# Patient Record
Sex: FEMALE | Race: WHITE | NOT HISPANIC OR LATINO | Employment: OTHER | URBAN - NONMETROPOLITAN AREA
[De-identification: names, ages, dates, MRNs, and addresses within clinical notes are randomized per-mention and may not be internally consistent; named-entity substitution may affect disease eponyms.]

---

## 2023-12-04 ENCOUNTER — TELEPHONE (OUTPATIENT)
Dept: CARDIOLOGY | Facility: CLINIC | Age: 78
End: 2023-12-04

## 2023-12-04 DIAGNOSIS — R79.89 ELEVATED TROPONIN: ICD-10-CM

## 2023-12-04 NOTE — TELEPHONE ENCOUNTER
Per Dr. Jackie Carrillo MD / hospital discharge patient to be scheduled for out patient lexiscan 2 day for increased troponin.

## 2023-12-13 ENCOUNTER — HOSPITAL ENCOUNTER (OUTPATIENT)
Dept: CARDIOLOGY | Facility: CLINIC | Age: 78
Discharge: HOME | End: 2023-12-13
Payer: MEDICARE

## 2023-12-13 ENCOUNTER — ANCILLARY PROCEDURE (OUTPATIENT)
Dept: RADIOLOGY | Facility: CLINIC | Age: 78
End: 2023-12-13
Payer: MEDICARE

## 2023-12-13 VITALS — DIASTOLIC BLOOD PRESSURE: 76 MMHG | HEART RATE: 72 BPM | SYSTOLIC BLOOD PRESSURE: 106 MMHG

## 2023-12-13 DIAGNOSIS — R79.89 ELEVATED TROPONIN: ICD-10-CM

## 2023-12-13 PROCEDURE — 93018 CV STRESS TEST I&R ONLY: CPT | Performed by: INTERNAL MEDICINE

## 2023-12-13 PROCEDURE — A9502 TC99M TETROFOSMIN: HCPCS | Performed by: INTERNAL MEDICINE

## 2023-12-13 PROCEDURE — 93017 CV STRESS TEST TRACING ONLY: CPT

## 2023-12-13 PROCEDURE — 3430000001 HC RX 343 DIAGNOSTIC RADIOPHARMACEUTICALS: Performed by: INTERNAL MEDICINE

## 2023-12-13 PROCEDURE — 93016 CV STRESS TEST SUPVJ ONLY: CPT | Performed by: INTERNAL MEDICINE

## 2023-12-13 PROCEDURE — 78452 HT MUSCLE IMAGE SPECT MULT: CPT | Performed by: INTERNAL MEDICINE

## 2023-12-13 PROCEDURE — 2500000004 HC RX 250 GENERAL PHARMACY W/ HCPCS (ALT 636 FOR OP/ED): Performed by: INTERNAL MEDICINE

## 2023-12-13 RX ORDER — REGADENOSON 0.08 MG/ML
0.4 INJECTION, SOLUTION INTRAVENOUS ONCE
Status: COMPLETED | OUTPATIENT
Start: 2023-12-13 | End: 2023-12-13

## 2023-12-13 RX ADMIN — TETROFOSMIN 35.4 MILLICURIE: 0.23 INJECTION, POWDER, LYOPHILIZED, FOR SOLUTION INTRAVENOUS at 13:39

## 2023-12-13 RX ADMIN — REGADENOSON 0.4 MG: 0.08 INJECTION, SOLUTION INTRAVENOUS at 13:38

## 2023-12-13 RX ADMIN — TETROFOSMIN 11 MILLICURIE: 0.23 INJECTION, POWDER, LYOPHILIZED, FOR SOLUTION INTRAVENOUS at 12:40

## 2023-12-18 ENCOUNTER — TELEPHONE (OUTPATIENT)
Dept: CARDIOLOGY | Facility: CLINIC | Age: 78
End: 2023-12-18
Payer: MEDICARE

## 2023-12-18 NOTE — TELEPHONE ENCOUNTER
----- Message from Jackie Carrillo MD sent at 12/17/2023  7:16 PM EST -----  Let her know the stress test came back showing an area of blockage, a heart cath is recommended  ----- Message -----  From: Interface, Radiology Results In  Sent: 12/15/2023   2:35 PM EST  To: Jackie Carrillo MD

## 2023-12-19 PROBLEM — I10 ESSENTIAL HYPERTENSION: Status: ACTIVE | Noted: 2023-12-19

## 2023-12-19 PROBLEM — J45.909 ASTHMA (HHS-HCC): Status: ACTIVE | Noted: 2023-12-19

## 2023-12-19 PROBLEM — R79.89 ELEVATED TROPONIN: Status: ACTIVE | Noted: 2023-12-19

## 2023-12-19 PROBLEM — R94.39 ABNORMAL CARDIOVASCULAR STRESS TEST: Status: ACTIVE | Noted: 2023-12-19

## 2023-12-19 RX ORDER — BUTALB/ACETAMINOPHEN/CAFFEINE 50-325-40
1 TABLET ORAL DAILY
COMMUNITY
Start: 2009-02-26

## 2023-12-19 RX ORDER — NAPROXEN 250 MG/1
250 TABLET ORAL
COMMUNITY
End: 2024-01-29 | Stop reason: ALTCHOICE

## 2023-12-19 RX ORDER — BISMUTH SUBSALICYLATE 262 MG
1 TABLET,CHEWABLE ORAL DAILY
COMMUNITY
End: 2024-01-29 | Stop reason: ALTCHOICE

## 2023-12-19 RX ORDER — NAPROXEN SODIUM 220 MG/1
1 TABLET, FILM COATED ORAL DAILY
COMMUNITY
Start: 2009-02-26

## 2023-12-19 RX ORDER — VERAPAMIL HYDROCHLORIDE 240 MG/1
240 TABLET, FILM COATED, EXTENDED RELEASE ORAL
COMMUNITY

## 2023-12-19 RX ORDER — HYDROCHLOROTHIAZIDE 25 MG/1
1 TABLET ORAL DAILY
COMMUNITY
Start: 2009-02-26 | End: 2024-04-10 | Stop reason: ALTCHOICE

## 2023-12-19 RX ORDER — FLUTICASONE PROPIONATE 50 MCG
2 SPRAY, SUSPENSION (ML) NASAL DAILY
COMMUNITY

## 2023-12-19 RX ORDER — MULTIVIT-MIN/IRON FUM/FOLIC AC 7.5 MG-4
1 TABLET ORAL DAILY
COMMUNITY

## 2023-12-19 RX ORDER — THEOPHYLLINE 400 MG/1
400 TABLET, EXTENDED RELEASE ORAL DAILY
COMMUNITY

## 2023-12-19 RX ORDER — VITAMIN B COMPLEX
1 CAPSULE ORAL DAILY
COMMUNITY

## 2023-12-19 RX ORDER — ATORVASTATIN CALCIUM 20 MG/1
20 TABLET, FILM COATED ORAL DAILY
COMMUNITY
Start: 2023-12-02 | End: 2024-01-25 | Stop reason: DRUGHIGH

## 2023-12-19 RX ORDER — MAGNESIUM GLYCINATE 100 MG
200 CAPSULE ORAL 2 TIMES DAILY
COMMUNITY

## 2023-12-19 RX ORDER — GUAIFENESIN AND PSEUDOEPHEDRINE HYDROCHLORIDE 1200; 120 MG/1; MG/1
TABLET, EXTENDED RELEASE ORAL
COMMUNITY

## 2023-12-19 RX ORDER — ALBUTEROL SULFATE 90 UG/1
2 AEROSOL, METERED RESPIRATORY (INHALATION) EVERY 4 HOURS PRN
COMMUNITY
Start: 2023-09-17

## 2023-12-19 NOTE — TELEPHONE ENCOUNTER
Spoke with patient advised of the above. Reviewed her medications and updated medication list. She verbalized understanding. Order form completed placed on Dr. Jackie Carrillo MD desk for signature. Transferred to scheduling

## 2023-12-20 ENCOUNTER — TELEPHONE (OUTPATIENT)
Dept: CARDIOLOGY | Facility: CLINIC | Age: 78
End: 2023-12-20
Payer: MEDICARE

## 2023-12-22 ENCOUNTER — TELEPHONE (OUTPATIENT)
Dept: CARDIOLOGY | Facility: CLINIC | Age: 78
End: 2023-12-22
Payer: MEDICARE

## 2023-12-22 NOTE — TELEPHONE ENCOUNTER
Patient phoned questioning when to hold her Aleve prior to heart cath that is scheduled for 1/10/2024. Discussed that this is not a normal medication  we tell to hold, but with it being a NSAID, it can thin her blood slightly. We recommended to hold for 5 days prior, reviewed with Bonnie HERNÁNDEZ LPN. Patient verbalized understanding.

## 2023-12-22 NOTE — TELEPHONE ENCOUNTER
Received phone call from [facility] cath lab stating patient was to arrive for heart catheterization today 12/22 by 12:30, patient was a no show. Phoned patient to check on status, patient states she was never given information, date/time of procedure. Will phone back to our office to reschedule.     Patient phoned back and rescheduled cath for 1/10/24 @1pm

## 2024-01-10 LAB
NON-UH HIE ANION GAP: 13.2 (ref 6–15)
NON-UH HIE BASOPHILS # (AUTO): 0.1 10*3/UL (ref 0–0.2)
NON-UH HIE BASOPHILS % (AUTO): 1.1 %
NON-UH HIE BLOOD UREA NITROGEN: 27 MG/DL (ref 7–25)
NON-UH HIE CARBON DIOXIDE: 29.5 MMOL/L (ref 21–31)
NON-UH HIE CHLORIDE: 99 MMOL/L (ref 98–107)
NON-UH HIE CREATININE CLR CALC PHARMACY: 42.87
NON-UH HIE CREATININE: 1.16 MG/DL (ref 0.6–1.2)
NON-UH HIE EOSINOPHILS # (AUTO): 0.3 10*3/UL (ref 0–0.45)
NON-UH HIE EOSINOPHILS % (AUTO): 2.3 %
NON-UH HIE ESTIMATED GFR: 47.66
NON-UH HIE HEMATOCRIT: 38.6 % (ref 34–46.4)
NON-UH HIE HEMOGLOBIN: 13.1 G/DL (ref 11.8–15.4)
NON-UH HIE INR: 1.1
NON-UH HIE LYMPHOCYTES # (AUTO): 2.5 10*3/UL (ref 1–4.8)
NON-UH HIE LYMPHOCYTES % (AUTO): 20.7 %
NON-UH HIE MEAN CORPUSCULAR HEMOGLOBIN: 29.2 PG (ref 24.7–34.3)
NON-UH HIE MEAN CORPUSCULAR HGB CONC: 34 G/DL (ref 32–35)
NON-UH HIE MEAN CORPUSCULAR VOLUME: 85.9 FL (ref 80–100)
NON-UH HIE MEAN PLATELET VOLUME: 7.8 FL (ref 6.3–10.7)
NON-UH HIE MONOCYTES # (AUTO): 0.9 10*3/UL (ref 0–0.8)
NON-UH HIE MONOCYTES % (AUTO): 7.3 %
NON-UH HIE NEUTROPHILS # (AUTO): 8.3 10*3/UL (ref 1.8–7.7)
NON-UH HIE NEUTROPHILS % (AUTO): 68.6 %
NON-UH HIE NRBC%: 0.1 /100{WBC} (ref 0–0.5)
NON-UH HIE PARTIAL THROMBOPLASTIN TIME: 31.8 S (ref 25.1–36.5)
NON-UH HIE PLATELET COUNT: 306 10*3/UL (ref 150–450)
NON-UH HIE POTASSIUM: 3.7 MMOL/L (ref 3.5–5.1)
NON-UH HIE PROTHROMBIN TIME: 12.7 S (ref 9–12.9)
NON-UH HIE RED BLOOD COUNT: 4.5 (ref 3.6–5)
NON-UH HIE RED CELL DISTRIBUTION WIDTH: 14 % (ref 11.9–15.3)
NON-UH HIE SODIUM: 138 MMOL/L (ref 136–145)
NON-UH HIE UNCORRECTED WBC: 12.1 10*3/UL (ref 3.8–11.6)
NON-UH HIE WHITE BLOOD COUNT: 12.1 10*3/UL (ref 3.8–11.6)

## 2024-01-11 ENCOUNTER — TELEPHONE (OUTPATIENT)
Dept: CARDIOLOGY | Facility: CLINIC | Age: 79
End: 2024-01-11
Payer: MEDICARE

## 2024-01-17 ENCOUNTER — APPOINTMENT (OUTPATIENT)
Dept: CARDIOLOGY | Facility: CLINIC | Age: 79
End: 2024-01-17
Payer: MEDICARE

## 2024-01-25 RX ORDER — ATORVASTATIN CALCIUM 80 MG/1
80 TABLET, FILM COATED ORAL NIGHTLY
COMMUNITY

## 2024-01-25 RX ORDER — CLOPIDOGREL BISULFATE 75 MG/1
1 TABLET ORAL DAILY
COMMUNITY

## 2024-01-25 RX ORDER — NITROGLYCERIN 0.4 MG/1
0.4 TABLET SUBLINGUAL EVERY 5 MIN PRN
COMMUNITY

## 2024-01-25 RX ORDER — VALSARTAN 80 MG/1
80 TABLET ORAL DAILY
COMMUNITY
End: 2024-02-13 | Stop reason: SINTOL

## 2024-01-29 ENCOUNTER — OFFICE VISIT (OUTPATIENT)
Dept: CARDIOLOGY | Facility: CLINIC | Age: 79
End: 2024-01-29
Payer: MEDICARE

## 2024-01-29 VITALS
SYSTOLIC BLOOD PRESSURE: 138 MMHG | HEIGHT: 65 IN | HEART RATE: 80 BPM | BODY MASS INDEX: 36.15 KG/M2 | DIASTOLIC BLOOD PRESSURE: 78 MMHG | WEIGHT: 217 LBS

## 2024-01-29 DIAGNOSIS — I25.10 3-VESSEL CORONARY ARTERY DISEASE: Primary | ICD-10-CM

## 2024-01-29 DIAGNOSIS — E66.9 CLASS II OBESITY: ICD-10-CM

## 2024-01-29 DIAGNOSIS — E78.2 MIXED HYPERLIPIDEMIA: ICD-10-CM

## 2024-01-29 DIAGNOSIS — I10 ESSENTIAL HYPERTENSION: ICD-10-CM

## 2024-01-29 DIAGNOSIS — R06.02 SHORTNESS OF BREATH: ICD-10-CM

## 2024-01-29 DIAGNOSIS — Z98.61 HX OF PERCUTANEOUS TRANSLUMINAL CORONARY ANGIOPLASTY: ICD-10-CM

## 2024-01-29 DIAGNOSIS — Z99.81 OXYGEN DEPENDENT: ICD-10-CM

## 2024-01-29 PROBLEM — E78.5 HYPERLIPIDEMIA: Status: ACTIVE | Noted: 2024-01-29

## 2024-01-29 PROCEDURE — 3078F DIAST BP <80 MM HG: CPT | Performed by: INTERNAL MEDICINE

## 2024-01-29 PROCEDURE — 3075F SYST BP GE 130 - 139MM HG: CPT | Performed by: INTERNAL MEDICINE

## 2024-01-29 PROCEDURE — 99214 OFFICE O/P EST MOD 30 MIN: CPT | Performed by: INTERNAL MEDICINE

## 2024-01-29 PROCEDURE — 1159F MED LIST DOCD IN RCRD: CPT | Performed by: INTERNAL MEDICINE

## 2024-01-29 PROCEDURE — 1036F TOBACCO NON-USER: CPT | Performed by: INTERNAL MEDICINE

## 2024-01-29 NOTE — PATIENT INSTRUCTIONS
Please bring all medicines, vitamins, and herbal supplements with you when you come to the office.    Prescriptions will not be filled unless you are compliant with your follow up appointments or have a follow up appointment scheduled as per instruction of your physician. Refills should be requested at the time of your visit.    Follow up   Same meds.  [facility]

## 2024-01-29 NOTE — PROGRESS NOTES
"Subjective   Eden Smith is a 78 y.o. female       Chief Complaint    Follow-up          HPI   Patient is in the office after having multivessel angioplasty January 2024 including LAD and left circumflex.  The right coronary artery was found to have 100% occlusion with left-to-right collaterals with preserved ejection fraction.  The angioplasty was uncomplicated.  The patient is oxygen dependent even though she had no previous history of tobacco abuse.  She is on 2.5 L/min.  She is on dual antiplatelet therapy without any problems.  She has hypertension hyperlipidemia under control medical therapy.  Since angioplasty her symptoms have resolved and she feels very well.  She had no bleeding complications no recurrent angina and no cardiac arrhythmias.  Her lung examination was normal, card examination was normal, no evidence of volume overload.    Assessment/recommendations:    1-three-vessel CAD with occluded RCA with left-to-right collaterals along with high-grade stenosis in the LAD and left circumflex both of which had drug-eluting stent January 2024.  EF is normal.  Will continue dual antiplatelet therapy and aggressive risk factor modification for CAD as noted.  2-hypertension, currently under control on medical therapy  3-hyperlipidemia on high intensity statin will monitor her lipid profile periodically  4-oxygen dependency in the absence of any evidence of severe COPD with no previous tobacco use.  Patient follow with pulmonary medicine  5-class II obesity, recommendation to cut back on calorie consumption and enroll in cardiac rehab was recommended  Review of Systems   All other systems reviewed and are negative.         Visit Vitals  /78 (BP Location: Left arm, Patient Position: Sitting)   Pulse 80   Ht 1.651 m (5' 5\")   Wt 98.4 kg (217 lb)   BMI 36.11 kg/m²   Smoking Status Never   BSA 2.12 m²        Objective   Physical Exam  Constitutional:       Appearance: Normal appearance. She is normal " weight.   HENT:      Nose: Nose normal.   Neck:      Vascular: No carotid bruit.   Cardiovascular:      Rate and Rhythm: Normal rate.      Pulses: Normal pulses.      Heart sounds: Normal heart sounds.   Pulmonary:      Effort: Pulmonary effort is normal.   Abdominal:      General: Bowel sounds are normal.      Palpations: Abdomen is soft.   Genitourinary:     Rectum: Normal.   Musculoskeletal:         General: Normal range of motion.      Cervical back: Normal range of motion.      Right lower leg: No edema.      Left lower leg: No edema.   Skin:     General: Skin is warm and dry.   Neurological:      General: No focal deficit present.      Mental Status: She is alert.   Psychiatric:         Mood and Affect: Mood normal.         Behavior: Behavior normal.         Thought Content: Thought content normal.         Judgment: Judgment normal.         Current Medications    Current Outpatient Medications:     albuterol 90 mcg/actuation inhaler, Inhale 2 puffs every 4 hours if needed., Disp: , Rfl:     aspirin 81 mg chewable tablet, Chew 1 tablet (81 mg) once daily., Disp: , Rfl:     atorvastatin (Lipitor) 80 mg tablet, Take 1 tablet (80 mg) by mouth once daily at bedtime., Disp: , Rfl:     b complex vitamins capsule, Take 1 capsule by mouth once daily., Disp: , Rfl:     calcium citrate-vitamin D3 (Citracal+D) 315 mg-5 mcg (200 unit) tablet, Take 1 tablet by mouth once daily., Disp: , Rfl:     clopidogrel (Plavix) 75 mg tablet, Take 1 tablet (75 mg) by mouth once daily., Disp: , Rfl:     fluticasone (Flonase) 50 mcg/actuation nasal spray, Administer 2 sprays into each nostril once daily., Disp: , Rfl:     hydroCHLOROthiazide (HYDRODiuril) 25 mg tablet, Take 1 tablet (25 mg) by mouth once daily., Disp: , Rfl:     magnesium glycinate 100 mg magnesium capsule, Take 2 capsules (200 mg) by mouth 2 times a day., Disp: , Rfl:     Mucinex D Maximum Strength 120-1,200 mg tablet extended release 12 hr, As directed, Disp: , Rfl:      multivitamin with minerals tablet, Take 1 tablet by mouth once daily., Disp: , Rfl:     nitroglycerin (Nitrostat) 0.4 mg SL tablet, Place 1 tablet (0.4 mg) under the tongue every 5 minutes if needed for chest pain., Disp: , Rfl:     theophylline ER (Uniphyl) 400 mg 24 hr tablet, Take 1 tablet (400 mg) by mouth once daily., Disp: , Rfl:     valsartan (Diovan) 80 mg tablet, Take 1 tablet (80 mg) by mouth once daily., Disp: , Rfl:     verapamil SR (Calan-SR) 240 mg ER tablet, Take 1 tablet (240 mg) by mouth once daily with breakfast., Disp: , Rfl:     vit A/vit C/vit E/zinc/copper (ICAPS AREDS ORAL), Take by mouth. As directed, Disp: , Rfl:                      Assessment/Plan   1. 3-vessel coronary artery disease  Follow Up In Cardiology    Referral to Cardiac Rehab      2. Hx of percutaneous transluminal coronary angioplasty  Follow Up In Cardiology    Referral to Cardiac Rehab      3. Essential hypertension  Follow Up In Cardiology    Referral to Cardiac Rehab      4. Mixed hyperlipidemia  Follow Up In Cardiology      5. Shortness of breath  Referral to Cardiac Rehab      6. Class II obesity        7. Oxygen dependent         Scribe Attestation  By signing my name below, I, [email or web address] , Scribe   attest that this documentation has been prepared under the direction and in the presence of Jackie Carrillo MD.

## 2024-01-29 NOTE — LETTER
January 29, 2024     Jose Locke MD  [address] [place] [id number]    Patient: Eden Smith   YOB: 1945   Date of Visit: 1/29/2024       Dear Dr. Jose Locke MD:    Thank you for referring Eden Smith to me for evaluation. Below are my notes for this consultation.  If you have questions, please do not hesitate to call me. I look forward to following your patient along with you.       Sincerely,     Jackie Carrillo MD      CC: No Recipients  ______________________________________________________________________________________

## 2024-02-13 DIAGNOSIS — I10 ESSENTIAL HYPERTENSION: ICD-10-CM

## 2024-02-13 NOTE — TELEPHONE ENCOUNTER
----- Message from Jackie Carrillo MD sent at 2/12/2024  2:11 PM EST -----  Switch to irbesartan 150 mg daily  ----- Message -----  From: Patricia Doss LPN  Sent: 2/12/2024   2:06 PM EST  To: Jackie Carrillo MD          ----- Message -----  From: Jackie Carrillo MD  Sent: 2/12/2024   1:15 PM EST  To: [facility] Clinical Support Staff    Stay on current medications as they are including hydrochlorothiazide and valsartan  ----- Message -----  From: Umm Odell LPN  Sent: 2/7/2024   5:00 PM EST  To: Jackie Carrillo MD

## 2024-02-14 ENCOUNTER — TELEPHONE (OUTPATIENT)
Dept: CARDIOLOGY | Facility: CLINIC | Age: 79
End: 2024-02-14
Payer: MEDICARE

## 2024-02-14 RX ORDER — IRBESARTAN 150 MG/1
150 TABLET ORAL NIGHTLY
Qty: 90 TABLET | Refills: 3 | Status: SHIPPED | OUTPATIENT
Start: 2024-02-14 | End: 2024-04-10 | Stop reason: DRUGHIGH

## 2024-02-14 NOTE — TELEPHONE ENCOUNTER
----- Message from Eden Smith sent at 2/13/2024  8:18 PM EST -----  Regarding: Change in medication  Contact: [phone]  Dear Dr. Carrillo -  I am very concerned about not receiving a replacement medication prescription for high blood pressure for Valsartan.  I have been without any medication to replace that since Monday morning because it made me stomach sick & lightheaded.  I have called to inquire about this to your office in [place] & always get a recording & I left the info on there that was asked for & left a message of my concern in the morning on Monday, February 12, & expressed to them that I needed a call back before 24 hours but I never heard from anyone.  I called again on Tuesday & left the same info & heard back that you were aware of the situation & as soon as they heard anything they would call me.  Today I received a call early afternoon informing me that you are prescribing 150 mg of a medication Irbesartan once a day & as soon as you sign the script they would send it to the pharmacy that I use which they asked me to confirm which is [facility].  So every so often today I checked with [facility] & nothing was ever sent to them & I kept checking until they closed today.  So that is 3 days without high blood pressure meds.  Is 3 days of no high blood pressure medication ok?  I asked them to find that out but never did get a response.  I am sitting here tonight wondering when this medication will be sent to [facility] & very worried because I don’t have it yet & been without it for 3 days now.      Thank you,  Eden Smith   [birth year]

## 2024-02-14 NOTE — TELEPHONE ENCOUNTER
Called patient and spoke with her. Apologized the RX was not sent yet. RX now signed and sent to [facility]. Patient advised. Verbalized understanding.

## 2024-04-10 ENCOUNTER — HOSPITAL ENCOUNTER (OUTPATIENT)
Dept: RADIOLOGY | Facility: EXTERNAL LOCATION | Age: 79
Discharge: HOME | End: 2024-04-10

## 2024-04-10 ENCOUNTER — OFFICE VISIT (OUTPATIENT)
Dept: CARDIOLOGY | Facility: CLINIC | Age: 79
End: 2024-04-10
Payer: MEDICARE

## 2024-04-10 VITALS
SYSTOLIC BLOOD PRESSURE: 144 MMHG | HEIGHT: 62 IN | DIASTOLIC BLOOD PRESSURE: 80 MMHG | HEART RATE: 100 BPM | WEIGHT: 205 LBS | BODY MASS INDEX: 37.73 KG/M2

## 2024-04-10 DIAGNOSIS — Z78.9 NEVER SMOKED TOBACCO: ICD-10-CM

## 2024-04-10 DIAGNOSIS — R06.02 SHORTNESS OF BREATH: ICD-10-CM

## 2024-04-10 DIAGNOSIS — I25.10 3-VESSEL CORONARY ARTERY DISEASE: ICD-10-CM

## 2024-04-10 DIAGNOSIS — Z98.61 HX OF PERCUTANEOUS TRANSLUMINAL CORONARY ANGIOPLASTY: ICD-10-CM

## 2024-04-10 DIAGNOSIS — I10 ESSENTIAL HYPERTENSION: ICD-10-CM

## 2024-04-10 DIAGNOSIS — E78.2 MIXED HYPERLIPIDEMIA: ICD-10-CM

## 2024-04-10 PROCEDURE — 99214 OFFICE O/P EST MOD 30 MIN: CPT | Performed by: INTERNAL MEDICINE

## 2024-04-10 PROCEDURE — 3077F SYST BP >= 140 MM HG: CPT | Performed by: INTERNAL MEDICINE

## 2024-04-10 PROCEDURE — 1036F TOBACCO NON-USER: CPT | Performed by: INTERNAL MEDICINE

## 2024-04-10 PROCEDURE — 1159F MED LIST DOCD IN RCRD: CPT | Performed by: INTERNAL MEDICINE

## 2024-04-10 PROCEDURE — 3079F DIAST BP 80-89 MM HG: CPT | Performed by: INTERNAL MEDICINE

## 2024-04-10 RX ORDER — INDAPAMIDE 2.5 MG/1
2.5 TABLET ORAL EVERY MORNING
Qty: 90 TABLET | Refills: 3 | Status: SHIPPED | OUTPATIENT
Start: 2024-04-10 | End: 2025-04-10

## 2024-04-10 RX ORDER — IRBESARTAN 300 MG/1
300 TABLET ORAL NIGHTLY
Qty: 90 TABLET | Refills: 3 | Status: SHIPPED | OUTPATIENT
Start: 2024-04-10 | End: 2025-04-10

## 2024-04-10 NOTE — LETTER
April 10, 2024     Jose Locke MD  [address] [place] [id number]    Patient: Eden Smith   YOB: 1945   Date of Visit: 4/10/2024       Dear Dr. Jose Locke MD:    Thank you for referring Eden Smith to me for evaluation. Below are my notes for this consultation.  If you have questions, please do not hesitate to call me. I look forward to following your patient along with you.       Sincerely,     Jacike Carrillo MD      CC: No Recipients  ______________________________________________________________________________________    Subjective   Eden Smith is a 78 y.o. female       Chief Complaint    Follow-up          HPI     Patient is in the office for follow-up for the problems noted below.  She has had no cardiac events since her last visit back in January.  She is still going to cardiac rehab.  She has requirement for oxygen that she uses on a portable format.  She has history of asthma but has not been followed by pulmonary medicine.  I believe the patient should have an evaluation by pulmonary medicine to determine whether she really need oxygen constantly or not.  She was found to be hypertensive in the office today.  She is on comprehensive medical therapy but no adjustment of hypertension therapy was recommended as noted below.  She remains in class II obesity and is working on getting her weight under control.  She had no angina pectoris and no bleeding complications on dual antiplatelet therapy.    Assessment/recommendations:     1-three-vessel CAD with occluded RCA with left-to-right collaterals along with high-grade stenosis in the LAD and left circumflex both of which had drug-eluting stent January 2024.  EF is normal.  Will continue dual antiplatelet therapy and aggressive risk factor modification for CAD as noted.  2-hypertension, currently not under control on current medical therapy, advised patient to increase irbesartan up to 300 mg daily and  "switch from hydrochlorothiazide to indapamide 2.5 mg daily, will follow her labs and BP check in few weeks  3-hyperlipidemia on high intensity statin will monitor her lipid profile periodically  4-oxygen dependency in the absence of any evidence of severe COPD with no previous tobacco use.  Patient follow with pulmonary medicine, PFT and chest x-ray were ordered  5-class II obesity, recommendation to cut back on calorie consumption and continue cardiac rehab  Review of Systems   All other systems reviewed and are negative.           Vitals:    04/10/24 1600 04/10/24 1603 04/10/24 1620   BP: (!) 162/98 152/88 144/80   BP Location: Right arm Left arm Right arm   Patient Position: Sitting Sitting Sitting   Pulse: 100     Weight: 93 kg (205 lb)     Height: 1.575 m (5' 2\")          Objective   Physical Exam  Constitutional:       Appearance: Normal appearance.   HENT:      Nose: Nose normal.   Neck:      Vascular: No carotid bruit.   Cardiovascular:      Rate and Rhythm: Normal rate.      Pulses: Normal pulses.      Heart sounds: Normal heart sounds.   Pulmonary:      Effort: Pulmonary effort is normal.   Abdominal:      General: Bowel sounds are normal.      Palpations: Abdomen is soft.   Musculoskeletal:         General: Normal range of motion.      Cervical back: Normal range of motion.      Right lower leg: No edema.      Left lower leg: No edema.   Skin:     General: Skin is warm and dry.   Neurological:      General: No focal deficit present.      Mental Status: She is alert.   Psychiatric:         Mood and Affect: Mood normal.         Behavior: Behavior normal.         Thought Content: Thought content normal.         Judgment: Judgment normal.         Allergies  Sulfamethoxazole and Bactrim [sulfamethoxazole-trimethoprim]     Current Medications    Current Outpatient Medications:   •  albuterol 90 mcg/actuation inhaler, Inhale 2 puffs every 4 hours if needed., Disp: , Rfl:   •  aspirin 81 mg chewable tablet, Chew " 1 tablet (81 mg) once daily., Disp: , Rfl:   •  atorvastatin (Lipitor) 80 mg tablet, Take 1 tablet (80 mg) by mouth once daily at bedtime., Disp: , Rfl:   •  b complex vitamins capsule, Take 1 capsule by mouth once daily., Disp: , Rfl:   •  calcium citrate-vitamin D3 (Citracal+D) 315 mg-5 mcg (200 unit) tablet, Take 1 tablet by mouth once daily., Disp: , Rfl:   •  clopidogrel (Plavix) 75 mg tablet, Take 1 tablet (75 mg) by mouth once daily., Disp: , Rfl:   •  fluticasone (Flonase) 50 mcg/actuation nasal spray, Administer 2 sprays into each nostril once daily., Disp: , Rfl:   •  magnesium glycinate 100 mg magnesium capsule, Take 2 capsules (200 mg) by mouth 2 times a day., Disp: , Rfl:   •  Mucinex D Maximum Strength 120-1,200 mg tablet extended release 12 hr, As directed, Disp: , Rfl:   •  multivitamin with minerals tablet, Take 1 tablet by mouth once daily., Disp: , Rfl:   •  nitroglycerin (Nitrostat) 0.4 mg SL tablet, Place 1 tablet (0.4 mg) under the tongue every 5 minutes if needed for chest pain., Disp: , Rfl:   •  theophylline ER (Uniphyl) 400 mg 24 hr tablet, Take 1 tablet (400 mg) by mouth once daily., Disp: , Rfl:   •  verapamil SR (Calan-SR) 240 mg ER tablet, Take 1 tablet (240 mg) by mouth once daily with breakfast., Disp: , Rfl:   •  vit A/vit C/vit E/zinc/copper (ICAPS AREDS ORAL), Take by mouth. As directed, Disp: , Rfl:   •  indapamide (Lozol) 2.5 mg tablet, Take 1 tablet (2.5 mg) by mouth once daily in the morning., Disp: 90 tablet, Rfl: 3  •  irbesartan (Avapro) 300 mg tablet, Take 1 tablet (300 mg) by mouth once daily at bedtime., Disp: 90 tablet, Rfl: 3                     Assessment/Plan   1. 3-vessel coronary artery disease  Follow Up In Cardiology    Follow Up In Cardiology      2. Hx of percutaneous transluminal coronary angioplasty  Follow Up In Cardiology      3. Essential hypertension  Follow Up In Cardiology    indapamide (Lozol) 2.5 mg tablet    irbesartan (Avapro) 300 mg tablet    Follow  Up In Cardiology      4. Mixed hyperlipidemia  Follow Up In Cardiology      5. BMI 37.0-37.9, adult        6. Never smoked tobacco        7. Shortness of breath  Referral to Pulmonology    Complete Pulmonary Function Test Pre/Post Bronchodialator (Spirometry Pre/Post/DLCO/Lung Volumes)    XR chest 2 views               Scribe Attestation  By signing my name below, IPatricia LPN  , Scribe   attest that this documentation has been prepared under the direction and in the presence of Jackie Carrillo MD.     Provider Attestation - Scribe documentation    All medical record entries made by the Scribe were at my direction and personally dictated by me. I have reviewed the chart and agree that the record accurately reflects my personal performance of the history, physical exam, discussion and plan.

## 2024-04-10 NOTE — PATIENT INSTRUCTIONS
Please bring all medicines, vitamins, and herbal supplements with you when you come to the office.    Prescriptions will not be filled unless you are compliant with your follow up appointments or have a follow up appointment scheduled as per instruction of your physician. Refills should be requested at the time of your visit.     BMI was above normal measurement. Current weight: 93 kg (205 lb)  Weight change since last visit (-) denotes wt loss -12 lbs   Weight loss needed to achieve BMI 25: 68.6 Lbs  Weight loss needed to achieve BMI 30: 41.3 Lbs  Provided instructions on dietary changes  Provided instructions on exercise.

## 2024-04-10 NOTE — PROGRESS NOTES
Subjective   Eden Smith is a 78 y.o. female       Chief Complaint    Follow-up          HPI     Patient is in the office for follow-up for the problems noted below.  She has had no cardiac events since her last visit back in January.  She is still going to cardiac rehab.  She has requirement for oxygen that she uses on a portable format.  She has history of asthma but has not been followed by pulmonary medicine.  I believe the patient should have an evaluation by pulmonary medicine to determine whether she really need oxygen constantly or not.  She was found to be hypertensive in the office today.  She is on comprehensive medical therapy but no adjustment of hypertension therapy was recommended as noted below.  She remains in class II obesity and is working on getting her weight under control.  She had no angina pectoris and no bleeding complications on dual antiplatelet therapy.    Assessment/recommendations:     1-three-vessel CAD with occluded RCA with left-to-right collaterals along with high-grade stenosis in the LAD and left circumflex both of which had drug-eluting stent January 2024.  EF is normal.  Will continue dual antiplatelet therapy and aggressive risk factor modification for CAD as noted.  2-hypertension, currently not under control on current medical therapy, advised patient to increase irbesartan up to 300 mg daily and switch from hydrochlorothiazide to indapamide 2.5 mg daily, will follow her labs and BP check in few weeks  3-hyperlipidemia on high intensity statin will monitor her lipid profile periodically  4-oxygen dependency in the absence of any evidence of severe COPD with no previous tobacco use.  Patient follow with pulmonary medicine, PFT and chest x-ray were ordered  5-class II obesity, recommendation to cut back on calorie consumption and continue cardiac rehab  Review of Systems   All other systems reviewed and are negative.           Vitals:    04/10/24 1600 04/10/24 1603  "04/10/24 1620   BP: (!) 162/98 152/88 144/80   BP Location: Right arm Left arm Right arm   Patient Position: Sitting Sitting Sitting   Pulse: 100     Weight: 93 kg (205 lb)     Height: 1.575 m (5' 2\")          Objective   Physical Exam  Constitutional:       Appearance: Normal appearance.   HENT:      Nose: Nose normal.   Neck:      Vascular: No carotid bruit.   Cardiovascular:      Rate and Rhythm: Normal rate.      Pulses: Normal pulses.      Heart sounds: Normal heart sounds.   Pulmonary:      Effort: Pulmonary effort is normal.   Abdominal:      General: Bowel sounds are normal.      Palpations: Abdomen is soft.   Musculoskeletal:         General: Normal range of motion.      Cervical back: Normal range of motion.      Right lower leg: No edema.      Left lower leg: No edema.   Skin:     General: Skin is warm and dry.   Neurological:      General: No focal deficit present.      Mental Status: She is alert.   Psychiatric:         Mood and Affect: Mood normal.         Behavior: Behavior normal.         Thought Content: Thought content normal.         Judgment: Judgment normal.         Allergies  Sulfamethoxazole and Bactrim [sulfamethoxazole-trimethoprim]     Current Medications    Current Outpatient Medications:     albuterol 90 mcg/actuation inhaler, Inhale 2 puffs every 4 hours if needed., Disp: , Rfl:     aspirin 81 mg chewable tablet, Chew 1 tablet (81 mg) once daily., Disp: , Rfl:     atorvastatin (Lipitor) 80 mg tablet, Take 1 tablet (80 mg) by mouth once daily at bedtime., Disp: , Rfl:     b complex vitamins capsule, Take 1 capsule by mouth once daily., Disp: , Rfl:     calcium citrate-vitamin D3 (Citracal+D) 315 mg-5 mcg (200 unit) tablet, Take 1 tablet by mouth once daily., Disp: , Rfl:     clopidogrel (Plavix) 75 mg tablet, Take 1 tablet (75 mg) by mouth once daily., Disp: , Rfl:     fluticasone (Flonase) 50 mcg/actuation nasal spray, Administer 2 sprays into each nostril once daily., Disp: , Rfl:     " magnesium glycinate 100 mg magnesium capsule, Take 2 capsules (200 mg) by mouth 2 times a day., Disp: , Rfl:     Mucinex D Maximum Strength 120-1,200 mg tablet extended release 12 hr, As directed, Disp: , Rfl:     multivitamin with minerals tablet, Take 1 tablet by mouth once daily., Disp: , Rfl:     nitroglycerin (Nitrostat) 0.4 mg SL tablet, Place 1 tablet (0.4 mg) under the tongue every 5 minutes if needed for chest pain., Disp: , Rfl:     theophylline ER (Uniphyl) 400 mg 24 hr tablet, Take 1 tablet (400 mg) by mouth once daily., Disp: , Rfl:     verapamil SR (Calan-SR) 240 mg ER tablet, Take 1 tablet (240 mg) by mouth once daily with breakfast., Disp: , Rfl:     vit A/vit C/vit E/zinc/copper (ICAPS AREDS ORAL), Take by mouth. As directed, Disp: , Rfl:     indapamide (Lozol) 2.5 mg tablet, Take 1 tablet (2.5 mg) by mouth once daily in the morning., Disp: 90 tablet, Rfl: 3    irbesartan (Avapro) 300 mg tablet, Take 1 tablet (300 mg) by mouth once daily at bedtime., Disp: 90 tablet, Rfl: 3                     Assessment/Plan   1. 3-vessel coronary artery disease  Follow Up In Cardiology    Follow Up In Cardiology      2. Hx of percutaneous transluminal coronary angioplasty  Follow Up In Cardiology      3. Essential hypertension  Follow Up In Cardiology    indapamide (Lozol) 2.5 mg tablet    irbesartan (Avapro) 300 mg tablet    Follow Up In Cardiology      4. Mixed hyperlipidemia  Follow Up In Cardiology      5. BMI 37.0-37.9, adult        6. Never smoked tobacco        7. Shortness of breath  Referral to Pulmonology    Complete Pulmonary Function Test Pre/Post Bronchodialator (Spirometry Pre/Post/DLCO/Lung Volumes)    XR chest 2 views               Scribe Attestation  By signing my name below, Patricia ROJAS LPN  , Scribe   attest that this documentation has been prepared under the direction and in the presence of Jackie Carrillo MD.     Provider Attestation - Scribe documentation    All medical record entries  made by the Scribe were at my direction and personally dictated by me. I have reviewed the chart and agree that the record accurately reflects my personal performance of the history, physical exam, discussion and plan.

## 2024-04-22 ENCOUNTER — APPOINTMENT (OUTPATIENT)
Dept: CARDIOLOGY | Facility: CLINIC | Age: 79
End: 2024-04-22
Payer: MEDICARE

## 2024-04-24 ENCOUNTER — TELEPHONE (OUTPATIENT)
Dept: CARDIOLOGY | Facility: CLINIC | Age: 79
End: 2024-04-24

## 2024-04-24 ENCOUNTER — APPOINTMENT (OUTPATIENT)
Dept: CARDIOLOGY | Facility: CLINIC | Age: 79
End: 2024-04-24
Payer: MEDICARE

## 2024-04-24 NOTE — TELEPHONE ENCOUNTER
Patient left vm via the nurse line states had to reschedule her appointment due to a bad migraine. Patient wanted Dr. Jackie Carrillo MD aware that when she was into office 04/10/2024 her BP was elevated due to stress, pain, traffic that day. Patient reports she was at the sleep apnea lab and her blood pressure was good 110/70, just an FYI.  Patient reports she is scheduled for ov no on 05/03/2024.

## 2024-05-03 ENCOUNTER — OFFICE VISIT (OUTPATIENT)
Dept: CARDIOLOGY | Facility: CLINIC | Age: 79
End: 2024-05-03
Payer: MEDICARE

## 2024-05-03 VITALS
HEIGHT: 62 IN | BODY MASS INDEX: 37.36 KG/M2 | HEART RATE: 88 BPM | DIASTOLIC BLOOD PRESSURE: 64 MMHG | SYSTOLIC BLOOD PRESSURE: 112 MMHG | WEIGHT: 203 LBS

## 2024-05-03 DIAGNOSIS — I10 ESSENTIAL HYPERTENSION: ICD-10-CM

## 2024-05-03 PROCEDURE — 1159F MED LIST DOCD IN RCRD: CPT | Performed by: INTERNAL MEDICINE

## 2024-05-03 PROCEDURE — 1036F TOBACCO NON-USER: CPT | Performed by: INTERNAL MEDICINE

## 2024-05-03 PROCEDURE — 3074F SYST BP LT 130 MM HG: CPT | Performed by: INTERNAL MEDICINE

## 2024-05-03 PROCEDURE — 99212 OFFICE O/P EST SF 10 MIN: CPT | Performed by: INTERNAL MEDICINE

## 2024-05-03 PROCEDURE — 3078F DIAST BP <80 MM HG: CPT | Performed by: INTERNAL MEDICINE

## 2024-05-03 NOTE — PROGRESS NOTES
"Subjective   Eden Smith is a 78 y.o. female       Chief Complaint    Blood Pressure Check          HPI   Patient is in the office for hypertension management.  Since we added indapamide and increase irbesartan up to 300 mg daily her pressure is completely normalized and she has had no side effects.  She is supposed to have basic metabolic profile and she would likely do it today.  She started cardiac rehab and is to continue for 3 months.  More weight loss was recommended  ROS         Vitals:    05/03/24 1342 05/03/24 1345   BP: 118/68 112/64   BP Location: Left arm Right arm   Patient Position: Sitting Sitting   Pulse: 88    Weight: 92.1 kg (203 lb)    Height: 1.575 m (5' 2\")         Objective   Physical Exam  Constitutional:       Appearance: Normal appearance.   HENT:      Nose: Nose normal.   Neck:      Vascular: No carotid bruit.   Cardiovascular:      Rate and Rhythm: Normal rate.      Pulses: Normal pulses.      Heart sounds: Normal heart sounds.   Pulmonary:      Effort: Pulmonary effort is normal.   Abdominal:      General: Bowel sounds are normal.      Palpations: Abdomen is soft.   Musculoskeletal:         General: Normal range of motion.      Cervical back: Normal range of motion.      Right lower leg: No edema.      Left lower leg: No edema.   Skin:     General: Skin is warm and dry.   Neurological:      General: No focal deficit present.      Mental Status: She is alert.   Psychiatric:         Mood and Affect: Mood normal.         Behavior: Behavior normal.         Thought Content: Thought content normal.         Judgment: Judgment normal.         Allergies  Sulfamethoxazole and Bactrim [sulfamethoxazole-trimethoprim]     Current Medications    Current Outpatient Medications:     albuterol 90 mcg/actuation inhaler, Inhale 2 puffs every 4 hours if needed., Disp: , Rfl:     aspirin 81 mg chewable tablet, Chew 1 tablet (81 mg) once daily., Disp: , Rfl:     atorvastatin (Lipitor) 80 mg tablet, Take 1 " tablet (80 mg) by mouth once daily at bedtime., Disp: , Rfl:     b complex vitamins capsule, Take 1 capsule by mouth once daily., Disp: , Rfl:     calcium citrate-vitamin D3 (Citracal+D) 315 mg-5 mcg (200 unit) tablet, Take 1 tablet by mouth once daily., Disp: , Rfl:     clopidogrel (Plavix) 75 mg tablet, Take 1 tablet (75 mg) by mouth once daily., Disp: , Rfl:     fluticasone (Flonase) 50 mcg/actuation nasal spray, Administer 2 sprays into each nostril once daily., Disp: , Rfl:     indapamide (Lozol) 2.5 mg tablet, Take 1 tablet (2.5 mg) by mouth once daily in the morning., Disp: 90 tablet, Rfl: 3    irbesartan (Avapro) 300 mg tablet, Take 1 tablet (300 mg) by mouth once daily at bedtime., Disp: 90 tablet, Rfl: 3    magnesium glycinate 100 mg magnesium capsule, Take 2 capsules (200 mg) by mouth 2 times a day., Disp: , Rfl:     Mucinex D Maximum Strength 120-1,200 mg tablet extended release 12 hr, As directed, Disp: , Rfl:     multivitamin with minerals tablet, Take 1 tablet by mouth once daily., Disp: , Rfl:     nitroglycerin (Nitrostat) 0.4 mg SL tablet, Place 1 tablet (0.4 mg) under the tongue every 5 minutes if needed for chest pain., Disp: , Rfl:     theophylline ER (Uniphyl) 400 mg 24 hr tablet, Take 1 tablet (400 mg) by mouth once daily., Disp: , Rfl:     verapamil SR (Calan-SR) 240 mg ER tablet, Take 1 tablet (240 mg) by mouth once daily with breakfast., Disp: , Rfl:     vit A/vit C/vit E/zinc/copper (ICAPS AREDS ORAL), Take by mouth. As directed, Disp: , Rfl:                      Assessment/Plan   1. Essential hypertension  Follow Up In Cardiology               Scribe Attestation  By signing my name below, Deepika ROJAS LPN Scribe   attest that this documentation has been prepared under the direction and in the presence of Jackie Carrillo MD.     Provider Attestation - Scribe documentation    All medical record entries made by the Scribe were at my direction and personally dictated by me. I have  reviewed the chart and agree that the record accurately reflects my personal performance of the history, physical exam, discussion and plan.

## 2024-05-22 LAB
NON-UH HIE ANION GAP: 11.4 (ref 6–15)
NON-UH HIE BLOOD UREA NITROGEN: 32 MG/DL (ref 7–25)
NON-UH HIE CALCIUM: 10.2 MG/DL (ref 8.6–10.3)
NON-UH HIE CARBON DIOXIDE: 28.8 MMOL/L (ref 21–31)
NON-UH HIE CHLORIDE: 102 MMOL/L (ref 98–107)
NON-UH HIE CREATININE: 0.91 MG/DL (ref 0.6–1.2)
NON-UH HIE ESTIMATED GFR: > 60
NON-UH HIE GLUCOSE: 102 MG/DL (ref 70–100)
NON-UH HIE POTASSIUM: 4.2 MMOL/L (ref 3.5–5.1)
NON-UH HIE SODIUM: 138 MMOL/L (ref 136–145)

## 2024-07-31 ENCOUNTER — TELEPHONE (OUTPATIENT)
Dept: CARDIOLOGY | Facility: CLINIC | Age: 79
End: 2024-07-31
Payer: MEDICARE

## 2024-07-31 NOTE — TELEPHONE ENCOUNTER
Patient leaves message stating she has had to miss sessions of cardiac rehab due to constipation issues that she feels she is experiencing due to medications she is taking for hypertension. She states she went to the ER for constipation issues and they gave her a script for polyethylene glycol (miralax). Patient states this was helping with her constipation however she does not have anymore and was inquiring if she can still take.     Spoke with patient and she states polyethylene glycol was helping with her constipation issues and helping her get back to cardiac rehab. Informed her that she can continue taking medication and it can be purchased over the counter. She verbalized understanding and states she will purchase some and is going to cardiac rehab Friday August 2nd

## 2024-08-05 NOTE — TELEPHONE ENCOUNTER
Patient phones in stating she has been unable to continue with cardiac rehab consistently due to various issues that have arose. She states she missed today due to having no hot water and she is missing the rest of the week because of other commitments. She informs us that they are requesting for her to have a new order placed for cardiac rehab at [facility] when she is ready to restart. Patient states she continues to do some of the exercises at home as she is able. Instructed patient to call office when she is ready to restart so new order can be sent. She verbalized understanding.

## 2024-08-14 DIAGNOSIS — D50.0 IRON DEFICIENCY ANEMIA DUE TO CHRONIC BLOOD LOSS: ICD-10-CM

## 2024-08-14 DIAGNOSIS — Z98.61 HX OF PERCUTANEOUS TRANSLUMINAL CORONARY ANGIOPLASTY: ICD-10-CM

## 2024-08-23 LAB
NON-UH HIE BASOPHILS # (AUTO): 0.1 10*3/UL (ref 0–0.2)
NON-UH HIE BASOPHILS % (AUTO): 0.7 %
NON-UH HIE EOSINOPHILS # (AUTO): 0.2 10*3/UL (ref 0–0.45)
NON-UH HIE EOSINOPHILS % (AUTO): 2.5 %
NON-UH HIE HEMATOCRIT: 36.8 % (ref 34–46.4)
NON-UH HIE HEMOGLOBIN: 12.7 G/DL (ref 11.8–15.4)
NON-UH HIE LYMPHOCYTES # (AUTO): 1.8 10*3/UL (ref 1–4.8)
NON-UH HIE LYMPHOCYTES % (AUTO): 17.9 %
NON-UH HIE MEAN CORPUSCULAR HEMOGLOBIN: 30.6 PG (ref 24.7–34.3)
NON-UH HIE MEAN CORPUSCULAR HGB CONC: 34.4 G/DL (ref 32–35)
NON-UH HIE MEAN CORPUSCULAR VOLUME: 89.2 FL (ref 80–100)
NON-UH HIE MEAN PLATELET VOLUME: 7.5 FL (ref 6.3–10.7)
NON-UH HIE MONOCYTES # (AUTO): 0.9 10*3/UL (ref 0–0.8)
NON-UH HIE MONOCYTES % (AUTO): 8.8 %
NON-UH HIE NEUTROPHILS # (AUTO): 6.8 10*3/UL (ref 1.8–7.7)
NON-UH HIE NEUTROPHILS % (AUTO): 70.1 %
NON-UH HIE NRBC%: 0.1 /100{WBC} (ref 0–0.5)
NON-UH HIE PLATELET COUNT: 266 10*3/UL (ref 150–450)
NON-UH HIE RED BLOOD COUNT: 4.13 (ref 3.6–5)
NON-UH HIE RED CELL DISTRIBUTION WIDTH: 14.1 % (ref 11.9–15.3)
NON-UH HIE UNCORRECTED WBC: 9.8 10*3/UL (ref 3.8–11.6)
NON-UH HIE WHITE BLOOD COUNT: 9.8 10*3/UL (ref 3.8–11.6)

## 2024-08-26 ENCOUNTER — TELEPHONE (OUTPATIENT)
Dept: CARDIOLOGY | Facility: CLINIC | Age: 79
End: 2024-08-26
Payer: MEDICARE

## 2024-08-26 NOTE — TELEPHONE ENCOUNTER
----- Message from Jackie Carrillo sent at 8/25/2024 12:59 PM EDT -----  Let her know her CBC results came back very good  ----- Message -----  From: Daphnie Colin - Lab Results In  Sent: 8/23/2024   3:55 PM EDT  To: Jackie Carrillo MD

## 2024-08-26 NOTE — TELEPHONE ENCOUNTER
Result Communication    Resulted Orders   [facility] Complete Blood Count Auto Diff   Result Value Ref Range    [facility] White Blood Count 9.8 3.8 - 11.6 10*3/uL    [facility] Uncorrected WBC 9.8 3.8 - 11.6 10*3/uL    [facility] Red Blood Count 4.13 3.60 - 5.00    [facility] Hemoglobin 12.7 11.8 - 15.4 g/dL    [facility] Hematocrit 36.8 34.0 - 46.4 %    [facility] Mean Corpuscular Volume 89.2 80 - 100 fL    [facility] Mean Corpuscular Hemoglobin 30.6 24.7 - 34.3 pg    [facility] Mean Corpuscular HGB Conc 34.4 32.0 - 35.0 g/dL    [facility] Red Cell Distribution Width 14.1 11.9 - 15.3 %    [facility] Platelet Count 266 150 - 450 10*3/uL    [facility] Mean Platelet Volume 7.5 6.3 - 10.7 fL    [facility] Neutrophils % (Auto) 70.1 . %    [facility] Lymphocytes % (Auto) 17.9 . %    [facility] Monocytes % (Auto) 8.8 . %    [facility] Eosinophils % (Auto) 2.5 . %    [facility] Basophils % (Auto) 0.7 . %    [facility] NRBC% 0.1 0 - 0.5 /100[WBC]    [facility] Neutrophils # (Auto) 6.8 1.8 - 7.7 10*3/uL    [facility] Lymphocytes # (Auto) 1.8 1.00 - 4.8 10*3/uL    [facility] Monocytes # (Auto) 0.9 (H) 0.0 - 0.8 10*3/uL    [facility] Eosinophils # (Auto) 0.2 0.0 - 0.45 10*3/uL    [facility] Basophils # (Auto) 0.1 0.0 - 0.2 10*3/uL      Comment:      PERFORMED BY:[place] [address] [place], [place] [id number] MEDICAL DIRECTORCOLBY BOBO M.D.       11:11 AM      Results were successfully communicated with the patient and they acknowledged their understanding.

## 2024-11-06 ENCOUNTER — APPOINTMENT (OUTPATIENT)
Dept: CARDIOLOGY | Facility: CLINIC | Age: 79
End: 2024-11-06
Payer: MEDICARE

## 2024-12-04 DIAGNOSIS — I25.10 3-VESSEL CORONARY ARTERY DISEASE: ICD-10-CM

## 2024-12-04 RX ORDER — CLOPIDOGREL BISULFATE 75 MG/1
75 TABLET ORAL DAILY
Qty: 90 TABLET | Refills: 3 | Status: SHIPPED | OUTPATIENT
Start: 2024-12-04 | End: 2025-12-04

## 2024-12-30 ENCOUNTER — APPOINTMENT (OUTPATIENT)
Dept: CARDIOLOGY | Facility: CLINIC | Age: 79
End: 2024-12-30
Payer: MEDICARE

## 2025-01-06 ENCOUNTER — APPOINTMENT (OUTPATIENT)
Dept: CARDIOLOGY | Facility: CLINIC | Age: 80
End: 2025-01-06
Payer: MEDICARE

## 2025-01-27 DIAGNOSIS — E78.2 MIXED HYPERLIPIDEMIA: Primary | ICD-10-CM

## 2025-01-29 RX ORDER — ATORVASTATIN CALCIUM 80 MG/1
80 TABLET, FILM COATED ORAL DAILY
Qty: 30 TABLET | Refills: 1 | Status: SHIPPED | OUTPATIENT
Start: 2025-01-29

## 2025-02-24 ENCOUNTER — APPOINTMENT (OUTPATIENT)
Dept: CARDIOLOGY | Facility: CLINIC | Age: 80
End: 2025-02-24
Payer: MEDICARE

## 2025-02-24 VITALS
DIASTOLIC BLOOD PRESSURE: 74 MMHG | HEART RATE: 72 BPM | SYSTOLIC BLOOD PRESSURE: 134 MMHG | HEIGHT: 64 IN | WEIGHT: 203.6 LBS | BODY MASS INDEX: 34.76 KG/M2

## 2025-02-24 DIAGNOSIS — E78.2 MIXED HYPERLIPIDEMIA: ICD-10-CM

## 2025-02-24 DIAGNOSIS — E66.811 OBESITY, CLASS I, BMI 30-34.9: ICD-10-CM

## 2025-02-24 DIAGNOSIS — Z78.9 NEVER SMOKED TOBACCO: ICD-10-CM

## 2025-02-24 DIAGNOSIS — Z98.61 HX OF PERCUTANEOUS TRANSLUMINAL CORONARY ANGIOPLASTY: ICD-10-CM

## 2025-02-24 DIAGNOSIS — I25.10 3-VESSEL CORONARY ARTERY DISEASE: Primary | ICD-10-CM

## 2025-02-24 DIAGNOSIS — I10 ESSENTIAL HYPERTENSION: ICD-10-CM

## 2025-02-24 PROCEDURE — 1159F MED LIST DOCD IN RCRD: CPT | Performed by: INTERNAL MEDICINE

## 2025-02-24 PROCEDURE — 99214 OFFICE O/P EST MOD 30 MIN: CPT | Performed by: INTERNAL MEDICINE

## 2025-02-24 PROCEDURE — 3075F SYST BP GE 130 - 139MM HG: CPT | Performed by: INTERNAL MEDICINE

## 2025-02-24 PROCEDURE — 1036F TOBACCO NON-USER: CPT | Performed by: INTERNAL MEDICINE

## 2025-02-24 PROCEDURE — 3078F DIAST BP <80 MM HG: CPT | Performed by: INTERNAL MEDICINE

## 2025-02-24 RX ORDER — ACETAMINOPHEN 500 MG
1000 TABLET ORAL 2 TIMES DAILY
COMMUNITY

## 2025-02-24 NOTE — PROGRESS NOTES
"Subjective   Eden Smith is a 79 y.o. female       Chief Complaint    Follow-up          HPI   Patient is in the office for follow-up for the problems noted below.  Since her last visit she has done exceedingly well.  She reports no symptoms of palpitations or chest pain.  She has not had blood work since her last visit.  She completed cardiac rehab without any events.  She remains class I obesity.  Examination outside of that was unremarkable.  Her medical therapy was reviewed and she  has tolerated dual antiplatelet therapy without any bleeding complication.    Assessment/recommendations:     1-three-vessel CAD with occluded RCA with left-to-right collaterals along with high-grade stenosis in the LAD and left circumflex both of which had drug-eluting stent January 2024.  EF is normal.  Will continue dual antiplatelet therapy and aggressive risk factor modification for CAD as noted.  CBC and lipid profile ordered.  2-essential hypertension, currently  under control on current medical therapy, no changes are needed, renal function is normal.  Lab data ordered to check on her electrolytes and renal function.  3-hyperlipidemia on high intensity statin will monitor her lipid profile periodically  4- class I obesity, recommendation to cut back on calorie consumption and maintain active lifestyle which she has been doing  Review of Systems   All other systems reviewed and are negative.           Vitals:    02/24/25 1542   BP: 134/74   BP Location: Right arm   Patient Position: Sitting   Pulse: 72   Weight: 92.4 kg (203 lb 9.6 oz)   Height: 1.626 m (5' 4\")        Objective   Physical Exam  Constitutional:       Appearance: Normal appearance.   HENT:      Nose: Nose normal.   Neck:      Vascular: No carotid bruit.   Cardiovascular:      Rate and Rhythm: Normal rate.      Pulses: Normal pulses.      Heart sounds: Normal heart sounds.   Pulmonary:      Effort: Pulmonary effort is normal.   Abdominal:      General: Bowel " sounds are normal.      Palpations: Abdomen is soft.   Musculoskeletal:         General: Normal range of motion.      Cervical back: Normal range of motion.      Right lower leg: No edema.      Left lower leg: No edema.   Skin:     General: Skin is warm and dry.   Neurological:      General: No focal deficit present.      Mental Status: She is alert.   Psychiatric:         Mood and Affect: Mood normal.         Behavior: Behavior normal.         Thought Content: Thought content normal.         Judgment: Judgment normal.         Allergies  Sulfamethoxazole     Current Medications    Current Outpatient Medications:     acetaminophen (Tylenol) 500 mg tablet, Take 2 tablets (1,000 mg) by mouth 2 times a day., Disp: , Rfl:     aspirin 81 mg chewable tablet, Chew 1 tablet (81 mg) once daily., Disp: , Rfl:     atorvastatin (Lipitor) 80 mg tablet, TAKE 1 TABLET BY MOUTH DAILY, Disp: 30 tablet, Rfl: 1    b complex vitamins capsule, Take 1 capsule by mouth once daily., Disp: , Rfl:     calcium citrate-vitamin D3 (Citracal+D) 315 mg-5 mcg (200 unit) tablet, Take 1 tablet by mouth once daily., Disp: , Rfl:     clopidogrel (Plavix) 75 mg tablet, Take 1 tablet (75 mg) by mouth once daily., Disp: 90 tablet, Rfl: 3    indapamide (Lozol) 2.5 mg tablet, Take 1 tablet (2.5 mg) by mouth once daily in the morning., Disp: 90 tablet, Rfl: 3    irbesartan (Avapro) 300 mg tablet, Take 1 tablet (300 mg) by mouth once daily at bedtime., Disp: 90 tablet, Rfl: 3    magnesium glycinate 100 mg magnesium capsule, Take 2 capsules (200 mg) by mouth 2 times a day., Disp: , Rfl:     multivitamin with minerals tablet, Take 1 tablet by mouth once daily., Disp: , Rfl:     mv-mn/C/glutamin/lysin/sxqc320 (AIRBORNE, ASCORBATE SODIUM, ORAL), Take by mouth., Disp: , Rfl:     nitroglycerin (Nitrostat) 0.4 mg SL tablet, Place 1 tablet (0.4 mg) under the tongue every 5 minutes if needed for chest pain., Disp: , Rfl:     psyllium seed, with sugar, (FIBER ORAL),  Take by mouth., Disp: , Rfl:     verapamil SR (Calan-SR) 240 mg ER tablet, Take 1 tablet (240 mg) by mouth once daily with breakfast., Disp: , Rfl:     vit A/vit C/vit E/zinc/copper (ICAPS AREDS ORAL), Take by mouth. As directed, Disp: , Rfl:                      Assessment/Plan   1. 3-vessel coronary artery disease  Follow Up In Cardiology    Follow Up In Cardiology    Basic Metabolic Panel    CBC    Basic Metabolic Panel    CBC      2. Hx of percutaneous transluminal coronary angioplasty        3. Mixed hyperlipidemia  Alanine Aminotransferase    Aspartate Aminotransferase    Lipid Panel    Alanine Aminotransferase    Aspartate Aminotransferase    Lipid Panel      4. Essential hypertension  Basic Metabolic Panel    CBC    Basic Metabolic Panel    CBC      5. BMI 34.0-34.9,adult        6. Never smoked tobacco                 Scribe Attestation  By signing my name below, I, Selene QUICK RN   , Scribe   attest that this documentation has been prepared under the direction and in the presence of Jackie Carrillo MD.     Provider Attestation - Scribe documentation    All medical record entries made by the Scribe were at my direction and personally dictated by me. I have reviewed the chart and agree that the record accurately reflects my personal performance of the history, physical exam, discussion and plan.

## 2025-02-24 NOTE — PATIENT INSTRUCTIONS
Please bring all medicines, vitamins, and herbal supplements with you when you come to the office.    Prescriptions will not be filled unless you are compliant with your follow up appointments or have a follow up appointment scheduled as per instruction of your physician. Refills should be requested at the time of your visit.     BMI was above normal measurement. Current weight: 92.4 kg (203 lb 9.6 oz)  Weight change since last visit (-) denotes wt loss 0.6 lbs   Weight loss needed to achieve BMI 25: 58.3 Lbs  Weight loss needed to achieve BMI 30: 29.2 Lbs  Provided instructions on dietary changes  Provided instructions on exercise.    Call with refills

## 2025-02-24 NOTE — LETTER
February 24, 2025     Jose Locke MD  [address] [place] [id number]    Patient: Eden Smith   YOB: 1945   Date of Visit: 2/24/2025       Dear Dr. Jose Locke MD:    Thank you for referring Eden Smith to me for evaluation. Below are my notes for this consultation.  If you have questions, please do not hesitate to call me. I look forward to following your patient along with you.       Sincerely,     Jackie Carrillo MD      CC: No Recipients  ______________________________________________________________________________________    Subjective   Eden Smith is a 79 y.o. female       Chief Complaint    Follow-up          HPI   Patient is in the office for follow-up for the problems noted below.  Since her last visit she has done exceedingly well.  She reports no symptoms of palpitations or chest pain.  She has not had blood work since her last visit.  She completed cardiac rehab without any events.  She remains class I obesity.  Examination outside of that was unremarkable.  Her medical therapy was reviewed and she  has tolerated dual antiplatelet therapy without any bleeding complication.    Assessment/recommendations:     1-three-vessel CAD with occluded RCA with left-to-right collaterals along with high-grade stenosis in the LAD and left circumflex both of which had drug-eluting stent January 2024.  EF is normal.  Will continue dual antiplatelet therapy and aggressive risk factor modification for CAD as noted.  CBC and lipid profile ordered.  2-essential hypertension, currently  under control on current medical therapy, no changes are needed, renal function is normal.  Lab data ordered to check on her electrolytes and renal function.  3-hyperlipidemia on high intensity statin will monitor her lipid profile periodically  4- class I obesity, recommendation to cut back on calorie consumption and maintain active lifestyle which she has been doing  Review of Systems   All  "other systems reviewed and are negative.           Vitals:    02/24/25 1542   BP: 134/74   BP Location: Right arm   Patient Position: Sitting   Pulse: 72   Weight: 92.4 kg (203 lb 9.6 oz)   Height: 1.626 m (5' 4\")        Objective   Physical Exam  Constitutional:       Appearance: Normal appearance.   HENT:      Nose: Nose normal.   Neck:      Vascular: No carotid bruit.   Cardiovascular:      Rate and Rhythm: Normal rate.      Pulses: Normal pulses.      Heart sounds: Normal heart sounds.   Pulmonary:      Effort: Pulmonary effort is normal.   Abdominal:      General: Bowel sounds are normal.      Palpations: Abdomen is soft.   Musculoskeletal:         General: Normal range of motion.      Cervical back: Normal range of motion.      Right lower leg: No edema.      Left lower leg: No edema.   Skin:     General: Skin is warm and dry.   Neurological:      General: No focal deficit present.      Mental Status: She is alert.   Psychiatric:         Mood and Affect: Mood normal.         Behavior: Behavior normal.         Thought Content: Thought content normal.         Judgment: Judgment normal.         Allergies  Sulfamethoxazole     Current Medications    Current Outpatient Medications:   •  acetaminophen (Tylenol) 500 mg tablet, Take 2 tablets (1,000 mg) by mouth 2 times a day., Disp: , Rfl:   •  aspirin 81 mg chewable tablet, Chew 1 tablet (81 mg) once daily., Disp: , Rfl:   •  atorvastatin (Lipitor) 80 mg tablet, TAKE 1 TABLET BY MOUTH DAILY, Disp: 30 tablet, Rfl: 1  •  b complex vitamins capsule, Take 1 capsule by mouth once daily., Disp: , Rfl:   •  calcium citrate-vitamin D3 (Citracal+D) 315 mg-5 mcg (200 unit) tablet, Take 1 tablet by mouth once daily., Disp: , Rfl:   •  clopidogrel (Plavix) 75 mg tablet, Take 1 tablet (75 mg) by mouth once daily., Disp: 90 tablet, Rfl: 3  •  indapamide (Lozol) 2.5 mg tablet, Take 1 tablet (2.5 mg) by mouth once daily in the morning., Disp: 90 tablet, Rfl: 3  •  irbesartan " (Avapro) 300 mg tablet, Take 1 tablet (300 mg) by mouth once daily at bedtime., Disp: 90 tablet, Rfl: 3  •  magnesium glycinate 100 mg magnesium capsule, Take 2 capsules (200 mg) by mouth 2 times a day., Disp: , Rfl:   •  multivitamin with minerals tablet, Take 1 tablet by mouth once daily., Disp: , Rfl:   •  mv-mn/C/glutamin/lysin/aisw159 (AIRBORNE, ASCORBATE SODIUM, ORAL), Take by mouth., Disp: , Rfl:   •  nitroglycerin (Nitrostat) 0.4 mg SL tablet, Place 1 tablet (0.4 mg) under the tongue every 5 minutes if needed for chest pain., Disp: , Rfl:   •  psyllium seed, with sugar, (FIBER ORAL), Take by mouth., Disp: , Rfl:   •  verapamil SR (Calan-SR) 240 mg ER tablet, Take 1 tablet (240 mg) by mouth once daily with breakfast., Disp: , Rfl:   •  vit A/vit C/vit E/zinc/copper (ICAPS AREDS ORAL), Take by mouth. As directed, Disp: , Rfl:                      Assessment/Plan   1. 3-vessel coronary artery disease  Follow Up In Cardiology    Follow Up In Cardiology    Basic Metabolic Panel    CBC    Basic Metabolic Panel    CBC      2. Hx of percutaneous transluminal coronary angioplasty        3. Mixed hyperlipidemia  Alanine Aminotransferase    Aspartate Aminotransferase    Lipid Panel    Alanine Aminotransferase    Aspartate Aminotransferase    Lipid Panel      4. Essential hypertension  Basic Metabolic Panel    CBC    Basic Metabolic Panel    CBC      5. BMI 34.0-34.9,adult        6. Never smoked tobacco                 Scribe Attestation  By signing my name below, I, Selene QUICK RN   , Scribe   attest that this documentation has been prepared under the direction and in the presence of Jackie Carrillo MD.     Provider Attestation - Scribe documentation    All medical record entries made by the Scribe were at my direction and personally dictated by me. I have reviewed the chart and agree that the record accurately reflects my personal performance of the history, physical exam, discussion and plan.

## 2025-03-07 DIAGNOSIS — E78.2 MIXED HYPERLIPIDEMIA: ICD-10-CM

## 2025-03-07 RX ORDER — ATORVASTATIN CALCIUM 80 MG/1
80 TABLET, FILM COATED ORAL DAILY
Qty: 90 TABLET | Refills: 2 | Status: SHIPPED | OUTPATIENT
Start: 2025-03-07

## 2025-03-28 DIAGNOSIS — I10 ESSENTIAL HYPERTENSION: ICD-10-CM

## 2025-03-28 RX ORDER — INDAPAMIDE 2.5 MG/1
2.5 TABLET ORAL EVERY MORNING
Qty: 90 TABLET | Refills: 3 | Status: SHIPPED | OUTPATIENT
Start: 2025-03-28 | End: 2026-03-28

## 2025-04-04 DIAGNOSIS — I10 ESSENTIAL HYPERTENSION: ICD-10-CM

## 2025-04-05 RX ORDER — IRBESARTAN 300 MG/1
300 TABLET ORAL NIGHTLY
Qty: 90 TABLET | Refills: 3 | Status: SHIPPED | OUTPATIENT
Start: 2025-04-05 | End: 2026-04-05

## 2025-08-11 ENCOUNTER — PATIENT MESSAGE (OUTPATIENT)
Dept: CARDIOLOGY | Facility: CLINIC | Age: 80
End: 2025-08-11
Payer: MEDICARE

## 2025-08-20 ENCOUNTER — RESULTS FOLLOW-UP (OUTPATIENT)
Dept: CARDIOLOGY | Facility: CLINIC | Age: 80
End: 2025-08-20
Payer: MEDICARE

## 2025-08-20 ENCOUNTER — PATIENT MESSAGE (OUTPATIENT)
Dept: CARDIOLOGY | Facility: CLINIC | Age: 80
End: 2025-08-20
Payer: MEDICARE

## 2025-08-20 DIAGNOSIS — Z98.61 HX OF PERCUTANEOUS TRANSLUMINAL CORONARY ANGIOPLASTY: ICD-10-CM

## 2025-08-20 DIAGNOSIS — R94.39 ABNORMAL CARDIOVASCULAR STRESS TEST: ICD-10-CM

## 2025-08-20 DIAGNOSIS — I25.10 3-VESSEL CORONARY ARTERY DISEASE: ICD-10-CM

## 2025-08-20 LAB
NON-UH HIE ALANINE AMINOTRANSFERASE: 19 U/L (ref 7–52)
NON-UH HIE ANION GAP: 11 (ref 6–15)
NON-UH HIE ASPARTATE AMINO TRANSFERASE: 20 U/L (ref 13–39)
NON-UH HIE BASOPHILS # (AUTO): 0.1 10*3/UL (ref 0–0.2)
NON-UH HIE BASOPHILS % (AUTO): 0.8 %
NON-UH HIE BLOOD UREA NITROGEN: 30 MG/DL (ref 7–25)
NON-UH HIE CALCIUM: 9.7 MG/DL (ref 8.6–10.3)
NON-UH HIE CARBON DIOXIDE: 28.2 MMOL/L (ref 21–31)
NON-UH HIE CHLORIDE: 103 MMOL/L (ref 98–107)
NON-UH HIE CHOL/HDL RATIO: 2.6
NON-UH HIE CHOLESTEROL: 117 MG/DL (ref 140–200)
NON-UH HIE CREATININE: 0.85 MG/DL (ref 0.6–1.2)
NON-UH HIE EOSINOPHILS # (AUTO): 0.2 10*3/UL (ref 0–0.45)
NON-UH HIE EOSINOPHILS % (AUTO): 2.8 %
NON-UH HIE ESTIMATED GFR: >60
NON-UH HIE GLUCOSE: 95 MG/DL (ref 70–100)
NON-UH HIE HDL CHOLESTEROL: 45 MG/DL (ref 23–92)
NON-UH HIE HEMATOCRIT: 38.7 % (ref 34–46.4)
NON-UH HIE HEMOGLOBIN: 13.2 G/DL (ref 11.8–15.4)
NON-UH HIE LDL CHOLESTEROL,CALCULATED: 38 MG/DL (ref 0–100)
NON-UH HIE LYMPHOCYTES # (AUTO): 2 10*3/UL (ref 1–4.8)
NON-UH HIE LYMPHOCYTES % (AUTO): 23.4 %
NON-UH HIE MEAN CORPUSCULAR HEMOGLOBIN: 30.3 PG (ref 24.7–34.3)
NON-UH HIE MEAN CORPUSCULAR HGB CONC: 34 G/DL (ref 32–35)
NON-UH HIE MEAN CORPUSCULAR VOLUME: 89.1 FL (ref 80–100)
NON-UH HIE MEAN PLATELET VOLUME: 7.7 FL (ref 6.3–10.7)
NON-UH HIE MONOCYTES # (AUTO): 0.8 10*3/UL (ref 0–0.8)
NON-UH HIE MONOCYTES % (AUTO): 9.2 %
NON-UH HIE NEUTROPHILS # (AUTO): 5.6 10*3/UL (ref 1.8–7.7)
NON-UH HIE NEUTROPHILS % (AUTO): 63.8 %
NON-UH HIE NRBC%: 0.1 /100{WBC} (ref 0–0.5)
NON-UH HIE PLATELET COUNT: 264 10*3/UL (ref 150–450)
NON-UH HIE POTASSIUM: 4.2 MMOL/L (ref 3.5–5.1)
NON-UH HIE RED BLOOD COUNT: 4.35 10*6/UL (ref 3.6–5)
NON-UH HIE RED CELL DISTRIBUTION WIDTH: 12.9 % (ref 11.9–15.3)
NON-UH HIE SODIUM: 138 MMOL/L (ref 136–145)
NON-UH HIE TRIGLYCERIDE W/REFLEX: 170 MG/DL (ref 0–149)
NON-UH HIE UNCORRECTED WBC: 8.7 10*3/UL (ref 3.8–11.6)
NON-UH HIE VLDL CHOLESTEROL: 34 MG/DL
NON-UH HIE WHITE BLOOD COUNT: 8.7 [CFU]/ML (ref 3.8–11.6)

## 2025-08-21 RX ORDER — NITROGLYCERIN 0.4 MG/1
0.4 TABLET SUBLINGUAL EVERY 5 MIN PRN
Qty: 25 TABLET | Refills: 3 | Status: SHIPPED | OUTPATIENT
Start: 2025-08-21

## 2025-10-15 ENCOUNTER — APPOINTMENT (OUTPATIENT)
Dept: CARDIOLOGY | Facility: CLINIC | Age: 80
End: 2025-10-15
Payer: MEDICARE

## 2025-10-20 ENCOUNTER — APPOINTMENT (OUTPATIENT)
Dept: CARDIOLOGY | Facility: CLINIC | Age: 80
End: 2025-10-20
Payer: MEDICARE